# Patient Record
Sex: MALE | Race: BLACK OR AFRICAN AMERICAN | NOT HISPANIC OR LATINO
[De-identification: names, ages, dates, MRNs, and addresses within clinical notes are randomized per-mention and may not be internally consistent; named-entity substitution may affect disease eponyms.]

---

## 2017-06-30 PROBLEM — Z00.00 ENCOUNTER FOR PREVENTIVE HEALTH EXAMINATION: Status: ACTIVE | Noted: 2017-06-30

## 2017-10-26 ENCOUNTER — APPOINTMENT (OUTPATIENT)
Dept: UROLOGY | Facility: CLINIC | Age: 52
End: 2017-10-26
Payer: COMMERCIAL

## 2017-10-26 VITALS
SYSTOLIC BLOOD PRESSURE: 128 MMHG | BODY MASS INDEX: 25.45 KG/M2 | HEIGHT: 73 IN | TEMPERATURE: 97.6 F | WEIGHT: 192 LBS | DIASTOLIC BLOOD PRESSURE: 85 MMHG | RESPIRATION RATE: 15 BRPM | HEART RATE: 56 BPM

## 2017-10-26 PROCEDURE — 99214 OFFICE O/P EST MOD 30 MIN: CPT

## 2018-05-13 ENCOUNTER — EMERGENCY (EMERGENCY)
Facility: HOSPITAL | Age: 53
LOS: 0 days | Discharge: HOME | End: 2018-05-13
Attending: EMERGENCY MEDICINE
Payer: COMMERCIAL

## 2018-05-13 VITALS
HEART RATE: 71 BPM | SYSTOLIC BLOOD PRESSURE: 131 MMHG | DIASTOLIC BLOOD PRESSURE: 87 MMHG | RESPIRATION RATE: 17 BRPM | WEIGHT: 184.97 LBS | TEMPERATURE: 98 F | OXYGEN SATURATION: 99 % | HEIGHT: 73 IN

## 2018-05-13 VITALS
SYSTOLIC BLOOD PRESSURE: 135 MMHG | OXYGEN SATURATION: 98 % | TEMPERATURE: 98 F | DIASTOLIC BLOOD PRESSURE: 81 MMHG | RESPIRATION RATE: 14 BRPM | HEART RATE: 67 BPM

## 2018-05-13 DIAGNOSIS — R55 SYNCOPE AND COLLAPSE: ICD-10-CM

## 2018-05-13 LAB
ALBUMIN SERPL ELPH-MCNC: 3.4 G/DL — SIGNIFICANT CHANGE UP (ref 3.3–5)
ALP SERPL-CCNC: 72 U/L — SIGNIFICANT CHANGE UP (ref 40–120)
ALT FLD-CCNC: 43 U/L — SIGNIFICANT CHANGE UP (ref 12–78)
ANION GAP SERPL CALC-SCNC: 8 MMOL/L — SIGNIFICANT CHANGE UP (ref 5–17)
APPEARANCE UR: CLEAR — SIGNIFICANT CHANGE UP
APTT BLD: 22.9 SEC — LOW (ref 27.5–37.4)
AST SERPL-CCNC: 34 U/L — SIGNIFICANT CHANGE UP (ref 15–37)
BASOPHILS # BLD AUTO: 0.02 K/UL — SIGNIFICANT CHANGE UP (ref 0–0.2)
BASOPHILS NFR BLD AUTO: 0.4 % — SIGNIFICANT CHANGE UP (ref 0–2)
BILIRUB SERPL-MCNC: 0.3 MG/DL — SIGNIFICANT CHANGE UP (ref 0.2–1.2)
BILIRUB UR-MCNC: NEGATIVE — SIGNIFICANT CHANGE UP
BUN SERPL-MCNC: 21 MG/DL — SIGNIFICANT CHANGE UP (ref 7–23)
CALCIUM SERPL-MCNC: 8.2 MG/DL — LOW (ref 8.5–10.1)
CHLORIDE SERPL-SCNC: 110 MMOL/L — HIGH (ref 96–108)
CK MB BLD-MCNC: 0.9 % — SIGNIFICANT CHANGE UP (ref 0–3.5)
CK MB CFR SERPL CALC: 2.6 NG/ML — SIGNIFICANT CHANGE UP (ref 0.5–3.6)
CK MB CFR SERPL CALC: 2.8 NG/ML — SIGNIFICANT CHANGE UP (ref 0.5–3.6)
CK SERPL-CCNC: 297 U/L — SIGNIFICANT CHANGE UP (ref 26–308)
CO2 SERPL-SCNC: 25 MMOL/L — SIGNIFICANT CHANGE UP (ref 22–31)
COLOR SPEC: YELLOW — SIGNIFICANT CHANGE UP
CREAT SERPL-MCNC: 1.17 MG/DL — SIGNIFICANT CHANGE UP (ref 0.5–1.3)
DIFF PNL FLD: NEGATIVE — SIGNIFICANT CHANGE UP
EOSINOPHIL # BLD AUTO: 0.23 K/UL — SIGNIFICANT CHANGE UP (ref 0–0.5)
EOSINOPHIL NFR BLD AUTO: 4.7 % — SIGNIFICANT CHANGE UP (ref 0–6)
GLUCOSE BLDC GLUCOMTR-MCNC: 122 MG/DL — HIGH (ref 70–99)
GLUCOSE SERPL-MCNC: 99 MG/DL — SIGNIFICANT CHANGE UP (ref 70–99)
GLUCOSE UR QL: NEGATIVE MG/DL — SIGNIFICANT CHANGE UP
HCT VFR BLD CALC: 39.2 % — SIGNIFICANT CHANGE UP (ref 39–50)
HGB BLD-MCNC: 12.8 G/DL — LOW (ref 13–17)
IMM GRANULOCYTES NFR BLD AUTO: 0.2 % — SIGNIFICANT CHANGE UP (ref 0–1.5)
INR BLD: 0.98 RATIO — SIGNIFICANT CHANGE UP (ref 0.88–1.16)
KETONES UR-MCNC: NEGATIVE — SIGNIFICANT CHANGE UP
LEUKOCYTE ESTERASE UR-ACNC: NEGATIVE — SIGNIFICANT CHANGE UP
LYMPHOCYTES # BLD AUTO: 1.59 K/UL — SIGNIFICANT CHANGE UP (ref 1–3.3)
LYMPHOCYTES # BLD AUTO: 32.6 % — SIGNIFICANT CHANGE UP (ref 13–44)
MAGNESIUM SERPL-MCNC: 2 MG/DL — SIGNIFICANT CHANGE UP (ref 1.6–2.6)
MCHC RBC-ENTMCNC: 28 PG — SIGNIFICANT CHANGE UP (ref 27–34)
MCHC RBC-ENTMCNC: 32.7 GM/DL — SIGNIFICANT CHANGE UP (ref 32–36)
MCV RBC AUTO: 85.8 FL — SIGNIFICANT CHANGE UP (ref 80–100)
MONOCYTES # BLD AUTO: 0.4 K/UL — SIGNIFICANT CHANGE UP (ref 0–0.9)
MONOCYTES NFR BLD AUTO: 8.2 % — SIGNIFICANT CHANGE UP (ref 2–14)
NEUTROPHILS # BLD AUTO: 2.63 K/UL — SIGNIFICANT CHANGE UP (ref 1.8–7.4)
NEUTROPHILS NFR BLD AUTO: 53.9 % — SIGNIFICANT CHANGE UP (ref 43–77)
NITRITE UR-MCNC: NEGATIVE — SIGNIFICANT CHANGE UP
PH UR: 5 — SIGNIFICANT CHANGE UP (ref 5–8)
PLATELET # BLD AUTO: 229 K/UL — SIGNIFICANT CHANGE UP (ref 150–400)
POTASSIUM SERPL-MCNC: 3.9 MMOL/L — SIGNIFICANT CHANGE UP (ref 3.5–5.3)
POTASSIUM SERPL-SCNC: 3.9 MMOL/L — SIGNIFICANT CHANGE UP (ref 3.5–5.3)
PROT SERPL-MCNC: 7 GM/DL — SIGNIFICANT CHANGE UP (ref 6–8.3)
PROT UR-MCNC: NEGATIVE MG/DL — SIGNIFICANT CHANGE UP
PROTHROM AB SERPL-ACNC: 10.7 SEC — SIGNIFICANT CHANGE UP (ref 9.8–12.7)
RBC # BLD: 4.57 M/UL — SIGNIFICANT CHANGE UP (ref 4.2–5.8)
RBC # FLD: 13.9 % — SIGNIFICANT CHANGE UP (ref 10.3–14.5)
SODIUM SERPL-SCNC: 143 MMOL/L — SIGNIFICANT CHANGE UP (ref 135–145)
SP GR SPEC: 1.01 — SIGNIFICANT CHANGE UP (ref 1.01–1.02)
TROPONIN I SERPL-MCNC: <.015 NG/ML — SIGNIFICANT CHANGE UP (ref 0.01–0.04)
TROPONIN I SERPL-MCNC: <.015 NG/ML — SIGNIFICANT CHANGE UP (ref 0.01–0.04)
UROBILINOGEN FLD QL: NEGATIVE MG/DL — SIGNIFICANT CHANGE UP
WBC # BLD: 4.88 K/UL — SIGNIFICANT CHANGE UP (ref 3.8–10.5)
WBC # FLD AUTO: 4.88 K/UL — SIGNIFICANT CHANGE UP (ref 3.8–10.5)

## 2018-05-13 PROCEDURE — 71045 X-RAY EXAM CHEST 1 VIEW: CPT | Mod: 26

## 2018-05-13 PROCEDURE — 70450 CT HEAD/BRAIN W/O DYE: CPT | Mod: 26

## 2018-05-13 PROCEDURE — 99285 EMERGENCY DEPT VISIT HI MDM: CPT | Mod: 25

## 2018-05-13 PROCEDURE — 93010 ELECTROCARDIOGRAM REPORT: CPT

## 2018-05-13 RX ORDER — SODIUM CHLORIDE 9 MG/ML
1000 INJECTION INTRAMUSCULAR; INTRAVENOUS; SUBCUTANEOUS ONCE
Qty: 0 | Refills: 0 | Status: COMPLETED | OUTPATIENT
Start: 2018-05-13 | End: 2018-05-13

## 2018-05-13 RX ORDER — ACETAMINOPHEN 500 MG
650 TABLET ORAL ONCE
Qty: 0 | Refills: 0 | Status: COMPLETED | OUTPATIENT
Start: 2018-05-13 | End: 2018-05-13

## 2018-05-13 RX ADMIN — SODIUM CHLORIDE 2000 MILLILITER(S): 9 INJECTION INTRAMUSCULAR; INTRAVENOUS; SUBCUTANEOUS at 07:00

## 2018-05-13 NOTE — ED PROVIDER NOTE - MEDICAL DECISION MAKING DETAILS
pt with syncopal episode in setting of pain from typical muscle cramp. Pt also had some pain around left arm/shoulder, ekg without obvious ischemic changes. will do labs, cth, cxr, CE x2, likely f/u pcp.

## 2018-05-13 NOTE — ED ADULT TRIAGE NOTE - CHIEF COMPLAINT QUOTE
woke up with left groing pain at 0500 , felt dizzy and fall syncope x 2 , hit his head, wife saw rolling eyes

## 2018-05-13 NOTE — ED PROVIDER NOTE - OBJECTIVE STATEMENT
51 y/o male no significant pmh present s/p syncopal episode earlier today after walking to bathroom. Pt woke up, had cramp anterior left thigh, typical of cramps he has been getting for many years, pain was severe, felt lightheaded and passed out, hit head per wife. Pt always has some pain in neck, c/o resolving pain along left shoulder between neck/arm. Denies headache, incontinence/tongue biting, hx seizure, cp, sob, abd pain, brbpr, melena, any other symptoms. Says feeling pretty much back to baseline.    ROS: No fever/chills. No eye pain/changes in vision, No ear pain/sore throat/dysphagia, No chest pain/palpitations. No SOB/cough/. No abdominal pain, N/V/D, no black/bloody bm. No dysuria/frequency/discharge, No headache. No Dizziness.    No rashes or breaks in skin. No numbness/tingling/weakness.

## 2018-05-13 NOTE — ED PROVIDER NOTE - PHYSICAL EXAMINATION
Gen: No acute distress, non toxic  HEENT: Mucous membranes moist, pink conjunctivae, EOMI. NCAT. PERRLA  Neck: no midline ttp.   CV: RRR, nl s1/s2. no murmurs  Resp: CTAB, normal rate and effort  GI: Abdomen soft, NT, ND. No rebound, no guarding  : No CVAT  Neuro: A&O x 3, moving all 4 extremities  MSK: No spine or joint tenderness to palpation. no thigh erythema/swelling/ttp. full rom  Skin: No rashes. intact and perfused.

## 2018-05-13 NOTE — ED ADULT NURSE NOTE - OBJECTIVE STATEMENT
BIBA Pt reports passing out x2 last time around 5am, pt reports falling and hitting head at home onto wooden floors. Pt denies c/o pain/discomfort at this time. Denies N/V. Denies dizziness/blurred vision

## 2018-05-13 NOTE — ED PROVIDER NOTE - PROGRESS NOTE DETAILS
Javier: Patient signed out by Dr. Vang pending labs and imaging. All reviewed. Patient re-assessed and continues to be in good condition. patient states he is ready to go home. patient discharged with care instructions. patient is to follow up with pmd. Discussed results and outcome of testing with the patient.  Patient advised to please follow up with their primary care doctor within the next 24 hours and return to the Emergency Department for worsening symptoms or any other concerns.  Patient advised that their doctor may call  to follow up on the specific results of the tests performed today in the emergency department.

## 2018-05-14 LAB
CULTURE RESULTS: NO GROWTH — SIGNIFICANT CHANGE UP
SPECIMEN SOURCE: SIGNIFICANT CHANGE UP

## 2019-01-27 ENCOUNTER — EMERGENCY (EMERGENCY)
Facility: HOSPITAL | Age: 54
LOS: 1 days | Discharge: HOME | End: 2019-01-27
Attending: EMERGENCY MEDICINE | Admitting: EMERGENCY MEDICINE
Payer: COMMERCIAL

## 2019-01-27 VITALS
OXYGEN SATURATION: 97 % | TEMPERATURE: 98 F | RESPIRATION RATE: 17 BRPM | SYSTOLIC BLOOD PRESSURE: 150 MMHG | DIASTOLIC BLOOD PRESSURE: 102 MMHG | HEART RATE: 76 BPM

## 2019-01-27 VITALS
DIASTOLIC BLOOD PRESSURE: 96 MMHG | HEART RATE: 72 BPM | SYSTOLIC BLOOD PRESSURE: 144 MMHG | TEMPERATURE: 98 F | RESPIRATION RATE: 17 BRPM | OXYGEN SATURATION: 100 %

## 2019-01-27 DIAGNOSIS — F40.01 AGORAPHOBIA WITH PANIC DISORDER: ICD-10-CM

## 2019-01-27 DIAGNOSIS — R69 ILLNESS, UNSPECIFIED: ICD-10-CM

## 2019-01-27 PROCEDURE — 90792 PSYCH DIAG EVAL W/MED SRVCS: CPT

## 2019-01-27 PROCEDURE — 99283 EMERGENCY DEPT VISIT LOW MDM: CPT | Mod: 25

## 2019-01-27 PROCEDURE — 99053 MED SERV 10PM-8AM 24 HR FAC: CPT

## 2019-01-27 NOTE — ED BEHAVIORAL HEALTH ASSESSMENT NOTE - DESCRIPTION
calm and cooperative. Appeared tired.   Vital Signs Last 24 Hrs  T(C): 36.8 (27 Jan 2019 02:20), Max: 36.8 (27 Jan 2019 02:20)  T(F): 98.3 (27 Jan 2019 02:20), Max: 98.3 (27 Jan 2019 02:20)  HR: 76 (27 Jan 2019 02:20) (76 - 76)  BP: 150/102 (27 Jan 2019 02:20) (150/102 - 150/102)  BP(mean): --  RR: 17 (27 Jan 2019 02:20) (17 - 17)  SpO2: 97% (27 Jan 2019 02:20) (97% - 97%) denied lives in [place] w/ wife and 24yo son, works for the [employer] for the past 31 years.

## 2019-01-27 NOTE — ED BEHAVIORAL HEALTH ASSESSMENT NOTE - SUMMARY
Mr. Flores is a 54yo AA man w/ no pphx who presented to the medical ED tonight s/p panic attack. Denied any other psychiatric symptoms. Not suicidal and has no hx of suicidality. He is having difficulty to sleep and this MD said he could take benadryl 50mg po qhs PRN for insomnia. Explained that medication will not be started in the ED and encouraged f/u w/ [facility] psychiatrist. He is not a danger to self or others and can be safely discharged to the community.

## 2019-01-27 NOTE — ED BEHAVIORAL HEALTH ASSESSMENT NOTE - HPI (INCLUDE ILLNESS QUALITY, SEVERITY, DURATION, TIMING, CONTEXT, MODIFYING FACTORS, ASSOCIATED SIGNS AND SYMPTOMS)
Patient was seen, and chart was reviewed. Mr. Flores is a 52yo AA man w/ no pphx who presented to the medical ED tonight s/p panic attack. Mr. Flroes said that he first experienced a panic attack in 11/18 in the bus on his way to work. He had a second panic attack in 12/18 and third one today. He said that he feels tachycardic, SOB, and feelings of despair. He said that it last between 2-10 min. Stated feeling claustrophobic as well. Denied any depressive, manic or psychotic symptoms. Endorsed difficulty sleeping. Denied suicidality, no previous attempts, plans, or intent. Denied cigfarette smocking, alcohol or drug use/abuse. He said that he saw a therapist once on 12/8 but did not return for an appointment. Spoke with his wife, who agreed w/ the plan.

## 2019-01-27 NOTE — ED BEHAVIORAL HEALTH ASSESSMENT NOTE - SUICIDE PROTECTIVE FACTORS
Supportive social network or family/Responsibility to family and others/Identifies reasons for living/Future oriented/Engaged in work or school/Positive therapeutic relationships/Fear of death or dying due to pain/suffering/High spirituality/High frustration tolerance/Ability to cope with stress

## 2019-01-27 NOTE — ED ADULT NURSE NOTE - NSIMPLEMENTINTERV_GEN_ALL_ED
Implemented All Universal Safety Interventions:  [place] to call system. Call bell, personal items and telephone within reach. Instruct patient to call for assistance. Room bathroom lighting operational. Non-slip footwear when patient is off stretcher. Physically safe environment: no spills, clutter or unnecessary equipment. Stretcher in lowest position, wheels locked, appropriate side rails in place.

## 2019-01-27 NOTE — ED BEHAVIORAL HEALTH ASSESSMENT NOTE - DETAILS
22yo son lives w/ him sister w/ depression and son w/ cannabis use d/o, substance induced psychosis vs schizophrenia discussed w/ patient and wife.

## 2019-01-27 NOTE — ED ADULT TRIAGE NOTE - CHIEF COMPLAINT QUOTE
Pt c/o panic attack at home, better now.  Denies any SI/HI/AH/VH.  Denies any drugs/ETOH. Calm and cooperative.  MD Salas for eval in triage.

## 2019-01-27 NOTE — ED ADULT NURSE REASSESSMENT NOTE - NS ED NURSE REASSESS COMMENT FT1
Pt is calm and cooperative. A/O x 3. Pt belongings returned by PES. Pt ambulated out of . pt wife was waiting outside of  for him.

## 2019-01-27 NOTE — ED ADULT NURSE NOTE - OBJECTIVE STATEMENT
Pt BIBA for anxiety/panic attack that occurred at home.  Pt currently undergoing testing to find out the cause of syncopal episodes that occurred in december.  Under the care of Neurologist and cardiologist but does not have results yet.  Had MRI awaiting EEG.  states that the unknown cause of the syncopal episodes have been causing increased stress in his life.  denies SI/HI/AVH.  denies any PPhx.

## 2019-01-27 NOTE — ED PROVIDER NOTE - OBJECTIVE STATEMENT
53 year old male presents with racing thoughts and insominia.  felt he could not calm down. denies si or hi.  feels better in ED. currently no complaints

## 2019-01-28 ENCOUNTER — OUTPATIENT (OUTPATIENT)
Dept: OUTPATIENT SERVICES | Facility: HOSPITAL | Age: 54
LOS: 1 days | Discharge: HOME | End: 2019-01-28

## 2019-01-29 DIAGNOSIS — F41.0 PANIC DISORDER [EPISODIC PAROXYSMAL ANXIETY]: ICD-10-CM

## 2019-01-29 DIAGNOSIS — R55 SYNCOPE AND COLLAPSE: ICD-10-CM

## 2019-03-22 ENCOUNTER — APPOINTMENT (OUTPATIENT)
Dept: UROLOGY | Facility: CLINIC | Age: 54
End: 2019-03-22
Payer: COMMERCIAL

## 2019-03-22 VITALS
WEIGHT: 200 LBS | HEIGHT: 73 IN | OXYGEN SATURATION: 98 % | HEART RATE: 60 BPM | TEMPERATURE: 97.2 F | DIASTOLIC BLOOD PRESSURE: 94 MMHG | BODY MASS INDEX: 26.51 KG/M2 | RESPIRATION RATE: 16 BRPM | SYSTOLIC BLOOD PRESSURE: 142 MMHG

## 2019-03-22 DIAGNOSIS — N28.1 CYST OF KIDNEY, ACQUIRED: ICD-10-CM

## 2019-03-22 PROCEDURE — 99213 OFFICE O/P EST LOW 20 MIN: CPT

## 2020-06-04 NOTE — ED ADULT TRIAGE NOTE - HEART RATE (BEATS/MIN)
76 Xenograft Text: The defect edges were debeveled with a #15 scalpel blade.  Given the location of the defect, shape of the defect and the proximity to free margins a xenograft was deemed most appropriate.  The graft was then trimmed to fit the size of the defect.  The graft was then placed in the primary defect and oriented appropriately.

## 2021-01-02 ENCOUNTER — EMERGENCY (EMERGENCY)
Facility: HOSPITAL | Age: 56
LOS: 0 days | Discharge: HOME | End: 2021-01-02
Attending: STUDENT IN AN ORGANIZED HEALTH CARE EDUCATION/TRAINING PROGRAM
Payer: COMMERCIAL

## 2021-01-02 VITALS
TEMPERATURE: 98 F | SYSTOLIC BLOOD PRESSURE: 137 MMHG | RESPIRATION RATE: 20 BRPM | HEART RATE: 88 BPM | OXYGEN SATURATION: 100 % | HEIGHT: 73 IN | DIASTOLIC BLOOD PRESSURE: 93 MMHG | WEIGHT: 197.98 LBS

## 2021-01-02 VITALS
TEMPERATURE: 98 F | RESPIRATION RATE: 18 BRPM | OXYGEN SATURATION: 100 % | DIASTOLIC BLOOD PRESSURE: 78 MMHG | HEART RATE: 74 BPM | SYSTOLIC BLOOD PRESSURE: 124 MMHG

## 2021-01-02 DIAGNOSIS — Z91.013 ALLERGY TO SEAFOOD: ICD-10-CM

## 2021-01-02 DIAGNOSIS — R55 SYNCOPE AND COLLAPSE: ICD-10-CM

## 2021-01-02 DIAGNOSIS — R11.0 NAUSEA: ICD-10-CM

## 2021-01-02 LAB
ALBUMIN SERPL ELPH-MCNC: 3.5 G/DL — SIGNIFICANT CHANGE UP (ref 3.3–5)
ALP SERPL-CCNC: 72 U/L — SIGNIFICANT CHANGE UP (ref 40–120)
ALT FLD-CCNC: 31 U/L — SIGNIFICANT CHANGE UP (ref 12–78)
ANION GAP SERPL CALC-SCNC: 4 MMOL/L — LOW (ref 5–17)
APTT BLD: 23.6 SEC — LOW (ref 27.5–35.5)
AST SERPL-CCNC: 24 U/L — SIGNIFICANT CHANGE UP (ref 15–37)
BASOPHILS # BLD AUTO: 0.03 K/UL — SIGNIFICANT CHANGE UP (ref 0–0.2)
BASOPHILS NFR BLD AUTO: 0.5 % — SIGNIFICANT CHANGE UP (ref 0–2)
BILIRUB SERPL-MCNC: 0.3 MG/DL — SIGNIFICANT CHANGE UP (ref 0.2–1.2)
BUN SERPL-MCNC: 18 MG/DL — SIGNIFICANT CHANGE UP (ref 7–23)
CALCIUM SERPL-MCNC: 9.1 MG/DL — SIGNIFICANT CHANGE UP (ref 8.5–10.1)
CHLORIDE SERPL-SCNC: 106 MMOL/L — SIGNIFICANT CHANGE UP (ref 96–108)
CK MB BLD-MCNC: 0.7 % — SIGNIFICANT CHANGE UP (ref 0–3.5)
CK MB CFR SERPL CALC: 1.4 NG/ML — SIGNIFICANT CHANGE UP (ref 0.5–3.6)
CK SERPL-CCNC: 194 U/L — SIGNIFICANT CHANGE UP (ref 26–308)
CO2 SERPL-SCNC: 29 MMOL/L — SIGNIFICANT CHANGE UP (ref 22–31)
CREAT SERPL-MCNC: 1.31 MG/DL — HIGH (ref 0.5–1.3)
EOSINOPHIL # BLD AUTO: 0.24 K/UL — SIGNIFICANT CHANGE UP (ref 0–0.5)
EOSINOPHIL NFR BLD AUTO: 3.7 % — SIGNIFICANT CHANGE UP (ref 0–6)
GLUCOSE SERPL-MCNC: 111 MG/DL — HIGH (ref 70–99)
HCT VFR BLD CALC: 40.7 % — SIGNIFICANT CHANGE UP (ref 39–50)
HGB BLD-MCNC: 13.7 G/DL — SIGNIFICANT CHANGE UP (ref 13–17)
IMM GRANULOCYTES NFR BLD AUTO: 0.3 % — SIGNIFICANT CHANGE UP (ref 0–1.5)
INR BLD: 0.98 RATIO — SIGNIFICANT CHANGE UP (ref 0.88–1.16)
LYMPHOCYTES # BLD AUTO: 1.56 K/UL — SIGNIFICANT CHANGE UP (ref 1–3.3)
LYMPHOCYTES # BLD AUTO: 24.2 % — SIGNIFICANT CHANGE UP (ref 13–44)
MCHC RBC-ENTMCNC: 28.4 PG — SIGNIFICANT CHANGE UP (ref 27–34)
MCHC RBC-ENTMCNC: 33.7 GM/DL — SIGNIFICANT CHANGE UP (ref 32–36)
MCV RBC AUTO: 84.3 FL — SIGNIFICANT CHANGE UP (ref 80–100)
MONOCYTES # BLD AUTO: 0.41 K/UL — SIGNIFICANT CHANGE UP (ref 0–0.9)
MONOCYTES NFR BLD AUTO: 6.4 % — SIGNIFICANT CHANGE UP (ref 2–14)
NEUTROPHILS # BLD AUTO: 4.19 K/UL — SIGNIFICANT CHANGE UP (ref 1.8–7.4)
NEUTROPHILS NFR BLD AUTO: 64.9 % — SIGNIFICANT CHANGE UP (ref 43–77)
NRBC # BLD: 0 /100 WBCS — SIGNIFICANT CHANGE UP (ref 0–0)
PLATELET # BLD AUTO: 232 K/UL — SIGNIFICANT CHANGE UP (ref 150–400)
POTASSIUM SERPL-MCNC: 4.7 MMOL/L — SIGNIFICANT CHANGE UP (ref 3.5–5.3)
POTASSIUM SERPL-SCNC: 4.7 MMOL/L — SIGNIFICANT CHANGE UP (ref 3.5–5.3)
PROT SERPL-MCNC: 7.4 GM/DL — SIGNIFICANT CHANGE UP (ref 6–8.3)
PROTHROM AB SERPL-ACNC: 11.4 SEC — SIGNIFICANT CHANGE UP (ref 10.6–13.6)
RBC # BLD: 4.83 M/UL — SIGNIFICANT CHANGE UP (ref 4.2–5.8)
RBC # FLD: 13.7 % — SIGNIFICANT CHANGE UP (ref 10.3–14.5)
SARS-COV-2 RNA SPEC QL NAA+PROBE: SIGNIFICANT CHANGE UP
SODIUM SERPL-SCNC: 139 MMOL/L — SIGNIFICANT CHANGE UP (ref 135–145)
TROPONIN I SERPL-MCNC: <.015 NG/ML — SIGNIFICANT CHANGE UP (ref 0.01–0.04)
WBC # BLD: 6.45 K/UL — SIGNIFICANT CHANGE UP (ref 3.8–10.5)
WBC # FLD AUTO: 6.45 K/UL — SIGNIFICANT CHANGE UP (ref 3.8–10.5)

## 2021-01-02 PROCEDURE — 99285 EMERGENCY DEPT VISIT HI MDM: CPT

## 2021-01-02 PROCEDURE — 71045 X-RAY EXAM CHEST 1 VIEW: CPT | Mod: 26

## 2021-01-02 PROCEDURE — 93010 ELECTROCARDIOGRAM REPORT: CPT

## 2021-01-02 PROCEDURE — 70450 CT HEAD/BRAIN W/O DYE: CPT | Mod: 26,MA

## 2021-01-02 NOTE — ED PROVIDER NOTE - CARE PROVIDER_API CALL
Josh Serrano  CARDIOLOGY  [address] [facility], [address] [place] [address]  Phone: [phone]  Fax: [phone]  Follow Up Time: 1-3 Days

## 2021-01-02 NOTE — ED ADULT NURSE REASSESSMENT NOTE - NS ED NURSE REASSESS COMMENT FT1
Pt resting comfortably at this time. No s/s of pain noted. cardiac monitor in progress. Awaiting results

## 2021-01-02 NOTE — ED ADULT NURSE NOTE - ED STAT RN HANDOFF DETAILS 2
Received pt from night RN, pt found resting on stretcher, vitals as noted. will continue to monitor awaiting further orders

## 2021-01-02 NOTE — ED ADULT TRIAGE NOTE - CHIEF COMPLAINT QUOTE
Pt states he woke up lightheaded, nauseated, and vomiting, felt like I was going to pass out.  no chest pain, no loc, no difficulty breathing.

## 2021-01-02 NOTE — ED ADULT NURSE NOTE - OBJECTIVE STATEMENT
Received patient in bed 7. AOX4. 55 year old male with h/o syncope (due to pain) presents today c/o waking up feeling nauseous and lightheaded, pt does state that he felt as if he was going to pass out but was able to sit and take deep breaths and found that his symptoms improved (-) chest pain (-) sob (-) vomiting (-) flu like symptoms (-) leg edema or calf pains (-) palpitations (-) diaphoresis

## 2021-01-02 NOTE — ED PROVIDER NOTE - OBJECTIVE STATEMENT
55 year old male with h/o syncope (due to pain) presents today c/o waking up feeling nauseous and lightheaded, pt does state that he felt as if he was going to pass out but was able to sit and take deep breaths and found that his symptoms improved (-) chest pain (-) sob (-) vomiting (-) flu like symptoms (-) leg edema or calf pains (-) palpitations (-) diaphoresis

## 2021-01-02 NOTE — ED ADULT NURSE NOTE - THROAT
Resolution of colitis. Please complete 10 days of antibiotics with ciprofloxacin and metronidazole. Please eat a high fibre diet (with 3-4 servings of fiber daily) and follow up with gastroenterology to perform a colonoscopy in 4-6 weeks after hospital discharge. asymptomatic

## 2021-01-02 NOTE — ED PROVIDER NOTE - CONSTITUTIONAL, MLM
normal... Well appearing, awake, alert, oriented to person, place, time/situation and in no apparent distress, pt is at his baseline

## 2021-01-02 NOTE — ED PROVIDER NOTE - PROGRESS NOTE DETAILS
Pt signed out by Dr. Arciniega as pending CT head. CT head negative, labs wnl, pt asymptomatic, chest pain free. Pt ready for d/c, f/u w pmd and referred to cardiology.

## 2021-04-14 NOTE — ED BEHAVIORAL HEALTH ASSESSMENT NOTE - REFERRAL / APPOINTMENT DETAILS
Respiratory at bedside.       Prakash Anderson RN  04/13/21 2051 information regarding the [facility] clinic provided

## 2021-12-15 NOTE — ED ADULT NURSE NOTE - NS PRO AD NO ADVANCE DIRECTIVE
Patient is scheduled to see Baylee Peck NP on Wed. 12/22/21 at 1130am. No pre op order seen in epic. Please place \" Service to Family Practice\" informing us in order what required testing is needed for patient to be cleared for upcoming surgery. Thank you.    No

## 2022-01-13 ENCOUNTER — APPOINTMENT (OUTPATIENT)
Dept: PULMONOLOGY | Facility: CLINIC | Age: 57
End: 2022-01-13

## 2022-04-21 ENCOUNTER — NON-APPOINTMENT (OUTPATIENT)
Age: 57
End: 2022-04-21

## 2022-04-21 ENCOUNTER — APPOINTMENT (OUTPATIENT)
Dept: PULMONOLOGY | Facility: CLINIC | Age: 57
End: 2022-04-21
Payer: COMMERCIAL

## 2022-04-21 VITALS
TEMPERATURE: 97 F | HEART RATE: 71 BPM | BODY MASS INDEX: 28.49 KG/M2 | RESPIRATION RATE: 15 BRPM | OXYGEN SATURATION: 98 % | HEIGHT: 73 IN | SYSTOLIC BLOOD PRESSURE: 143 MMHG | DIASTOLIC BLOOD PRESSURE: 88 MMHG | WEIGHT: 215 LBS

## 2022-04-21 DIAGNOSIS — R06.83 SNORING: ICD-10-CM

## 2022-04-21 PROCEDURE — 99204 OFFICE O/P NEW MOD 45 MIN: CPT

## 2022-04-21 NOTE — REVIEW OF SYSTEMS
[EDS: ESS=____] : daytime somnolence: ESS=[unfilled] [Fatigue] : fatigue [Recent Wt Gain (___ Lbs)] : recent [unfilled] ~Ulb weight gain [A.M. Dry Mouth] : a.m. dry mouth [Nocturia] : nocturia [Snoring] : snoring [Frequent Nocturnal Awakenings] : frequent nocturnal awakenings from sleep [Daytime Somnolence: ESS=____] : daytime somnolence: ESS=[unfilled] [Awakes Unrefreshed] : nonrestorative sleep [Awakes With Dry Mouth] : awakes with dry mouth [Hypersomnolence] : sleeping much more than usual [Negative] : Psychiatric [Difficulty Initiating Sleep] : no difficulty falling asleep [Difficulty Maintaining Sleep] : no difficulty maintaining sleep [Lower Extremity Discomfort] : no lower extremity discomfort [Unusual Sleep Behavior] : no unusual sleep behavior

## 2022-04-21 NOTE — PHYSICAL EXAM
[General Appearance - Well Developed] : well developed [Normal Appearance] : normal appearance [Well Groomed] : well groomed [General Appearance - Well Nourished] : well nourished [No Deformities] : no deformities [General Appearance - In No Acute Distress] : no acute distress [Normal Conjunctiva] : the conjunctiva exhibited no abnormalities [Eyelids - No Xanthelasma] : the eyelids demonstrated no xanthelasmas [Low Lying Soft Palate] : low lying soft palate [II] : II [Neck Appearance] : the appearance of the neck was normal [Neck Cervical Mass (___cm)] : no neck mass was observed [Jugular Venous Distention Increased] : there was no jugular-venous distention [Thyroid Diffuse Enlargement] : the thyroid was not enlarged [Thyroid Nodule] : there were no palpable thyroid nodules [Heart Rate And Rhythm] : heart rate was normal and rhythm regular [Heart Sounds] : normal S1 and S2 [Heart Sounds Gallop] : no gallops [Murmurs] : no murmurs [Heart Sounds Pericardial Friction Rub] : no pericardial rub [Auscultation Breath Sounds / Voice Sounds] : lungs were clear to auscultation bilaterally [Abnormal Walk] : normal gait [Musculoskeletal - Swelling] : no joint swelling seen [Motor Tone] : muscle strength and tone were normal [Nail Clubbing] : no clubbing of the fingernails [Cyanosis, Localized] : no localized cyanosis [Petechial Hemorrhages (___cm)] : no petechial hemorrhages [Skin Color & Pigmentation] : normal skin color and pigmentation [Skin Turgor] : normal skin turgor [] : no rash [Oriented To Time, Place, And Person] : oriented to person, place, and time [Impaired Insight] : insight and judgment were intact [Affect] : the affect was normal

## 2022-04-21 NOTE — ASSESSMENT
[FreeTextEntry1] : 55 y/o M with a PMH of HTN presented to the sleep clinic for an initial evaluation.  \par \par His main sleep related complaint is snoring and nonrestorative sleep. His oropharynx is crowded due to enlarged base of tongue and low lying soft palate -- all of which increase risk of BHARATI. Assessment and treatment of BHARATI is important for management of somnolence and nonrestorative sleep as well as for cardiovascular risk reduction.  The patient was referred to the [facility] for diagnostic HSAT for further assessment. The ramifications of obstructive sleep apnea and its potential therapeutic modalities were discussed with the patient. The dangers of drowsy driving were discussed with the patient.  The patient was warned to avoid drowsy driving. The patient will followup after results of this study are available.\par  \par Mateo Menezes, DO\par Pulmonary, Critical Care and Sleep Medicine Attending

## 2022-04-21 NOTE — HISTORY OF PRESENT ILLNESS
[Snoring] : snoring [Frequent Nocturnal Awakening] : frequent nocturnal awakening [Unintentional Sleep While Inactive] : unintentional sleep while inactive [Awakes Unrefreshed] : awakening unrefreshed [Awakening With Dry Mouth] : awakening with dry mouth [Recent  Weight Gain] : recent weight gain [Daytime Somnolence] : daytime somnolence [Hypersomnolence] : hypersomnolence [FreeTextEntry1] : 57 y/o M with a PMH of HTN presented to the sleep clinic for an initial evaluation.  \par \par His main sleep related complaint is snoring and nonrestorative sleep.  He recently retired and goes to bed at 3 am and wakes up at 9 am in the morning.  Notes at least one awakening at night.  (+) nocturia.  Wakes up with a dry mouth and feels unrefreshed.  Has EDS with an ESS of 12.  Has about a 5 lbs weight gain since his longterm.\par \par ____________________________________________________________________\par EPWORTH SLEEPINESS SCALE\par \par How likely are you to doze off or fall asleep in the situations described below, in contrast to feeling just tired?  \par This refers to your usual way of life in recent times.  \par Even if you haven't done some of these things recently, try to work out how they would have affected you.\par Use the following scale to choose one most appropriate number for each situation.\par \par Chance of dozing.............Situation\par ..............3..........................Sitting and reading\par ..............1..........................Watching TV\par ..............1..........................Sitting inactive in a public place (eg a theatre or a meeting)\par ..............1..........................As a passenger in a car for an hour without a break\par ..............3..........................Lying down to rest in the afternoon when circumstances permit\par ..............1..........................Sitting and talking to someone\par ..............1..........................Sitting quietly after lunch without alcohol\par ..............1..........................In a car, while stopped for a few minutes in traffic\par \par .............12...........................TOTAL SCORE\par \par 0 = Never would doze\par 1 = Slight chance of dozing\par 2 = Moderate chance of dozing\par 3 = High chance of dozing \par ______________________________________________________________________  [Witnessed Apneas] : no witnessed sleep apnea [Awakes with Headache] : no headache upon awakening [DIS] : no DIS [DMS] : no DMS [Unusual Sleep Behavior] : no unusual sleep behavior [Cataplexy] : no cataplexy [Sleep Paralysis] : no sleep paralysis [Hypnagogic Hallucinations] : no hallucinations when falling asleep [Hypnopompic Hallucinations] : no hallucinations when awakening [Lower Extremity Discomfort] : no lower extremity discomfort in evening or at bedtime [ESS] : 12

## 2022-06-03 NOTE — ED PROVIDER NOTE - PATIENT PORTAL LINK FT
H&P reviewed. The patient was examined and there are no changes to the H&P.   You can access the FollowMyHealth Patient Portal offered by [facility] by registering at the following website: [email or web address]. By joining [facility] [employer]’s FollowMyHealth portal, you will also be able to view your health information using other applications (apps) compatible with our system.

## 2022-06-12 ENCOUNTER — FORM ENCOUNTER (OUTPATIENT)
Age: 57
End: 2022-06-12

## 2022-06-13 ENCOUNTER — OUTPATIENT (OUTPATIENT)
Dept: OUTPATIENT SERVICES | Facility: HOSPITAL | Age: 57
LOS: 1 days | End: 2022-06-13
Payer: COMMERCIAL

## 2022-06-13 ENCOUNTER — APPOINTMENT (OUTPATIENT)
Dept: SLEEP CENTER | Facility: CLINIC | Age: 57
End: 2022-06-13
Payer: COMMERCIAL

## 2022-06-13 PROCEDURE — 95806 SLEEP STUDY UNATT&RESP EFFT: CPT

## 2022-06-13 PROCEDURE — 95806 SLEEP STUDY UNATT&RESP EFFT: CPT | Mod: 26

## 2022-06-23 ENCOUNTER — OUTPATIENT (OUTPATIENT)
Dept: OUTPATIENT SERVICES | Facility: HOSPITAL | Age: 57
LOS: 1 days | Discharge: HOME | End: 2022-06-23

## 2022-06-23 ENCOUNTER — APPOINTMENT (OUTPATIENT)
Dept: CT IMAGING | Facility: HOSPITAL | Age: 57
End: 2022-06-23

## 2022-06-23 DIAGNOSIS — I77.810 THORACIC AORTIC ECTASIA: ICD-10-CM

## 2022-06-23 PROCEDURE — 71275 CT ANGIOGRAPHY CHEST: CPT | Mod: 26

## 2022-06-28 ENCOUNTER — NON-APPOINTMENT (OUTPATIENT)
Age: 57
End: 2022-06-28

## 2022-06-28 DIAGNOSIS — G47.33 OBSTRUCTIVE SLEEP APNEA (ADULT) (PEDIATRIC): ICD-10-CM

## 2022-07-14 ENCOUNTER — APPOINTMENT (OUTPATIENT)
Dept: PULMONOLOGY | Facility: CLINIC | Age: 57
End: 2022-07-14

## 2022-07-14 DIAGNOSIS — F51.12 INSUFFICIENT SLEEP SYNDROME: ICD-10-CM

## 2022-07-14 DIAGNOSIS — G47.33 OBSTRUCTIVE SLEEP APNEA (ADULT) (PEDIATRIC): ICD-10-CM

## 2022-07-14 PROCEDURE — 99215 OFFICE O/P EST HI 40 MIN: CPT | Mod: 95

## 2022-07-14 RX ORDER — CHLORTHALIDONE 25 MG/1
25 TABLET ORAL
Qty: 90 | Refills: 0 | Status: ACTIVE | COMMUNITY
Start: 2022-03-22

## 2022-07-14 RX ORDER — PREDNISONE 50 MG/1
50 TABLET ORAL
Qty: 3 | Refills: 0 | Status: COMPLETED | COMMUNITY
Start: 2022-06-16

## 2022-07-14 RX ORDER — FLUTICASONE PROPIONATE 50 UG/1
50 SPRAY, METERED NASAL
Refills: 0 | Status: ACTIVE | COMMUNITY

## 2022-07-14 RX ORDER — COVID-19 ANTIGEN TEST
KIT MISCELLANEOUS
Qty: 8 | Refills: 0 | Status: COMPLETED | COMMUNITY
Start: 2022-06-24

## 2022-07-14 RX ORDER — UBIDECARENONE 200 MG
CAPSULE ORAL
Refills: 0 | Status: ACTIVE | COMMUNITY

## 2022-07-14 RX ORDER — POLYETHYLENE GLYCOL-3350 AND ELECTROLYTES WITH FLAVOR PACK 240; 5.84; 2.98; 6.72; 22.72 G/278.26G; G/278.26G; G/278.26G; G/278.26G; G/278.26G
240 POWDER, FOR SOLUTION ORAL
Qty: 4000 | Refills: 0 | Status: COMPLETED | COMMUNITY
Start: 2022-03-25

## 2022-07-14 NOTE — REVIEW OF SYSTEMS
[Fatigue] : fatigue [Shortness Of Breath] : shortness of breath [Nasal Congestion] : nasal congestion [Heartburn] : heartburn [Anxious] : anxious [Negative] : Musculoskeletal [Chest Pain] : no chest pain [Palpitations] : no palpitations [Edema] : ~T edema was not present [Depression] : no depression [FreeTextEntry3] : fatigue attributed to heat [FreeTextEntry8] : D [FreeTextEntry9] : heart beats hard being evaluated by cardiologist [de-identified] : seeing a therapist
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
## 2022-07-14 NOTE — ASSESSMENT
[FreeTextEntry1] : 57 year  KIM BEE  recently diagnosed with severe obstructive sleep apnea, presents for further discussion of apnea and therapy.  History of:  hypertension and anxiety.\par \par SEVERE BHARATI (AHI 48.6) \par \par The ramifications of BHARATI and its potential therapeutic modalities were discussed with the patient.  \par \par SLEEP DEPRIVATION\par \par Insufficient sleep resulting in nonrestorative sleep and sleepiness\par The consequences of sleep deprivation on overall health, mood, concentration, and memory were discussed with the patient. He was strongly recommended to increase sleep time to as close to 7.5 hours as possible, by advancing bedtime from 2am to his preferred 11pm-12am bedtime.   \par \par Patient was advised to acquire his cardiac testing results for his records.\par He was referred to the [facility] for a titration sleep study.  \par He will follow up by phone 2-weeks after the sleep study if he has not been contacted by then, to start therapy.\par \par

## 2022-08-22 ENCOUNTER — EMERGENCY (EMERGENCY)
Facility: HOSPITAL | Age: 57
LOS: 1 days | Discharge: HOME | End: 2022-08-22
Attending: EMERGENCY MEDICINE | Admitting: EMERGENCY MEDICINE

## 2022-08-22 VITALS
TEMPERATURE: 98 F | OXYGEN SATURATION: 100 % | DIASTOLIC BLOOD PRESSURE: 89 MMHG | SYSTOLIC BLOOD PRESSURE: 142 MMHG | HEIGHT: 73 IN | RESPIRATION RATE: 16 BRPM | HEART RATE: 63 BPM

## 2022-08-22 VITALS
SYSTOLIC BLOOD PRESSURE: 137 MMHG | DIASTOLIC BLOOD PRESSURE: 88 MMHG | HEART RATE: 64 BPM | OXYGEN SATURATION: 100 % | RESPIRATION RATE: 18 BRPM | TEMPERATURE: 99 F

## 2022-08-22 LAB
ALBUMIN SERPL ELPH-MCNC: 4.3 G/DL — SIGNIFICANT CHANGE UP (ref 3.3–5)
ALP SERPL-CCNC: 80 U/L — SIGNIFICANT CHANGE UP (ref 40–120)
ALT FLD-CCNC: 23 U/L — SIGNIFICANT CHANGE UP (ref 4–41)
ANION GAP SERPL CALC-SCNC: 11 MMOL/L — SIGNIFICANT CHANGE UP (ref 7–14)
AST SERPL-CCNC: 24 U/L — SIGNIFICANT CHANGE UP (ref 4–40)
BASOPHILS # BLD AUTO: 0.03 K/UL — SIGNIFICANT CHANGE UP (ref 0–0.2)
BASOPHILS NFR BLD AUTO: 0.4 % — SIGNIFICANT CHANGE UP (ref 0–2)
BILIRUB SERPL-MCNC: 0.3 MG/DL — SIGNIFICANT CHANGE UP (ref 0.2–1.2)
BUN SERPL-MCNC: 15 MG/DL — SIGNIFICANT CHANGE UP (ref 7–23)
CALCIUM SERPL-MCNC: 9.6 MG/DL — SIGNIFICANT CHANGE UP (ref 8.4–10.5)
CHLORIDE SERPL-SCNC: 108 MMOL/L — HIGH (ref 98–107)
CO2 SERPL-SCNC: 24 MMOL/L — SIGNIFICANT CHANGE UP (ref 22–31)
CREAT SERPL-MCNC: 1.07 MG/DL — SIGNIFICANT CHANGE UP (ref 0.5–1.3)
EGFR: 81 ML/MIN/1.73M2 — SIGNIFICANT CHANGE UP
EOSINOPHIL # BLD AUTO: 0.11 K/UL — SIGNIFICANT CHANGE UP (ref 0–0.5)
EOSINOPHIL NFR BLD AUTO: 1.4 % — SIGNIFICANT CHANGE UP (ref 0–6)
GLUCOSE SERPL-MCNC: 109 MG/DL — HIGH (ref 70–99)
HCT VFR BLD CALC: 41.9 % — SIGNIFICANT CHANGE UP (ref 39–50)
HGB BLD-MCNC: 13.7 G/DL — SIGNIFICANT CHANGE UP (ref 13–17)
IANC: 5.49 K/UL — SIGNIFICANT CHANGE UP (ref 1.8–7.4)
IMM GRANULOCYTES NFR BLD AUTO: 0.3 % — SIGNIFICANT CHANGE UP (ref 0–1.5)
LIDOCAIN IGE QN: 36 U/L — SIGNIFICANT CHANGE UP (ref 7–60)
LYMPHOCYTES # BLD AUTO: 1.73 K/UL — SIGNIFICANT CHANGE UP (ref 1–3.3)
LYMPHOCYTES # BLD AUTO: 22.3 % — SIGNIFICANT CHANGE UP (ref 13–44)
MCHC RBC-ENTMCNC: 28.2 PG — SIGNIFICANT CHANGE UP (ref 27–34)
MCHC RBC-ENTMCNC: 32.7 GM/DL — SIGNIFICANT CHANGE UP (ref 32–36)
MCV RBC AUTO: 86.2 FL — SIGNIFICANT CHANGE UP (ref 80–100)
MONOCYTES # BLD AUTO: 0.39 K/UL — SIGNIFICANT CHANGE UP (ref 0–0.9)
MONOCYTES NFR BLD AUTO: 5 % — SIGNIFICANT CHANGE UP (ref 2–14)
NEUTROPHILS # BLD AUTO: 5.49 K/UL — SIGNIFICANT CHANGE UP (ref 1.8–7.4)
NEUTROPHILS NFR BLD AUTO: 70.6 % — SIGNIFICANT CHANGE UP (ref 43–77)
NRBC # BLD: 0 /100 WBCS — SIGNIFICANT CHANGE UP (ref 0–0)
NRBC # FLD: 0 K/UL — SIGNIFICANT CHANGE UP (ref 0–0)
PLATELET # BLD AUTO: 237 K/UL — SIGNIFICANT CHANGE UP (ref 150–400)
POTASSIUM SERPL-MCNC: 4.7 MMOL/L — SIGNIFICANT CHANGE UP (ref 3.5–5.3)
POTASSIUM SERPL-SCNC: 4.7 MMOL/L — SIGNIFICANT CHANGE UP (ref 3.5–5.3)
PROT SERPL-MCNC: 7.5 G/DL — SIGNIFICANT CHANGE UP (ref 6–8.3)
RBC # BLD: 4.86 M/UL — SIGNIFICANT CHANGE UP (ref 4.2–5.8)
RBC # FLD: 14 % — SIGNIFICANT CHANGE UP (ref 10.3–14.5)
SODIUM SERPL-SCNC: 143 MMOL/L — SIGNIFICANT CHANGE UP (ref 135–145)
WBC # BLD: 7.77 K/UL — SIGNIFICANT CHANGE UP (ref 3.8–10.5)
WBC # FLD AUTO: 7.77 K/UL — SIGNIFICANT CHANGE UP (ref 3.8–10.5)

## 2022-08-22 PROCEDURE — 99284 EMERGENCY DEPT VISIT MOD MDM: CPT

## 2022-08-22 RX ORDER — METOCLOPRAMIDE HCL 10 MG
10 TABLET ORAL ONCE
Refills: 0 | Status: COMPLETED | OUTPATIENT
Start: 2022-08-22 | End: 2022-08-22

## 2022-08-22 RX ORDER — FAMOTIDINE 10 MG/ML
20 INJECTION INTRAVENOUS ONCE
Refills: 0 | Status: COMPLETED | OUTPATIENT
Start: 2022-08-22 | End: 2022-08-22

## 2022-08-22 RX ADMIN — Medication 10 MILLIGRAM(S): at 03:27

## 2022-08-22 RX ADMIN — Medication 30 MILLILITER(S): at 03:27

## 2022-08-22 RX ADMIN — FAMOTIDINE 20 MILLIGRAM(S): 10 INJECTION INTRAVENOUS at 03:27

## 2022-08-22 NOTE — ED PROVIDER NOTE - ATTENDING CONTRIBUTION TO CARE
I, Dr Nicholas Rodgers wrote the initial note in its entirety and the resident only contributed to the progress note and disposition with which I agree.

## 2022-08-22 NOTE — ED PROVIDER NOTE - PRINCIPAL DIAGNOSIS
Abdominal pain
I personally performed the service described in the documentation recorded by the scribe in my presence, and it accurately and completely records my words and actions.

## 2022-08-22 NOTE — ED PROVIDER NOTE - PROGRESS NOTE DETAILS
Nisha Martinez PGY3: Labs unremarkable. Pt was re-evaluated at bedside, VSS, feeling better overall, tolerating PO. Results were discussed with patient as well as return precautions and follow up plan with PCP and/or specialist. Time was taken to answer any questions that the patient had before providing them with discharge paperwork.

## 2022-08-22 NOTE — ED ADULT TRIAGE NOTE - CHIEF COMPLAINT QUOTE
Pt. brought to [facility] ED by OLIVIA for abdominal pain. Admits to nausea. PMH: Borderline HTN(non-compliant with medications), NAD noted. Pt. appears comfortable at triage.

## 2022-08-22 NOTE — ED PROVIDER NOTE - OBJECTIVE STATEMENT
56 yo M with HTN and no past surgical history that presents with abd pain. Per pt, 3 hours prior to arrival, started feeling "queezy" and went home, had normal BM and felt improved. no N/V but had severe pain in lower abd. Wife called 911. Pt states in ambulance felt like he was going to have diarrhea but only passed gas and felt improvement. Denies any recent illnesses, fever, chills, chest pain, SOB, cough, URI, UTI symptoms. Non smoker.

## 2022-08-22 NOTE — ED ADULT NURSE NOTE - OBJECTIVE STATEMENT
pt received room 2, alert & awake, A&OX4. Pt complaint of intermittent abd pain, fatigue. Pt PMH of HTN. Pt states that sx started around 12AM. Pt noted to be distended, pt denies new finding, abd soft & tender on palpation. Pt denies abd pain, n/v, GI &  sx, c/p, sob.  Will continue to monitor.

## 2022-08-22 NOTE — ED PROVIDER NOTE - PATIENT PORTAL LINK FT
You can access the FollowMyHealth Patient Portal offered by [facility] by registering at the following website: [email or web address]. By joining [employer]s FollowMyHealth portal, you will also be able to view your health information using other applications (apps) compatible with our system.

## 2022-08-22 NOTE — ED ADULT NURSE NOTE - CHIEF COMPLAINT QUOTE
Pt. brought to [facility] ED by OLIVIA for abdominal pain. Admits to nausea. PMH: Borderline HTN(non-compliant with medications), NAD noted. Pt. appears comfortable at triage.

## 2022-08-22 NOTE — ED PROVIDER NOTE - CLINICAL SUMMARY MEDICAL DECISION MAKING FREE TEXT BOX
58 yo M with HTN that presents with abd pain and distension. Felt queezy, had a normal BM and is passing gas in ambulance which both made him feel improvement in symptoms/pain. At this time minimal distension on exam, nontender throughout abd including RUQ/RLQ and EPIGASTRIUM. Likely gas related pains. No surgical history so unlikely SBO and is passing gas. No N/V. Will obtain labs and provide meds and reassess.

## 2023-02-02 ENCOUNTER — APPOINTMENT (OUTPATIENT)
Dept: UROLOGY | Facility: CLINIC | Age: 58
End: 2023-02-02
Payer: COMMERCIAL

## 2023-02-02 DIAGNOSIS — N52.9 MALE ERECTILE DYSFUNCTION, UNSPECIFIED: ICD-10-CM

## 2023-02-02 PROCEDURE — 99203 OFFICE O/P NEW LOW 30 MIN: CPT

## 2023-02-03 NOTE — ASSESSMENT
[FreeTextEntry1] : With his constellation of symptoms and complaints today, I would suggest that he could trial tadalafil 5 mg daily.  This medication may help address both his complaints of the urinary symptoms as well as the erectile dysfunction.  We reviewed the mechanism of this medication and the potential side effects that may be experienced including headaches, sinus congestion, and flushing.  He will let us know how he responds after trialing this medication.\par \par We also reviewed prostate cancer screening.  He has not had a PSA level known for some time although there may have been one checked in your office.  I elected to recheck it today and the level was 1.03 ng/mL suggesting that he remains at very low risk for harboring clinically significant prostate cancer and I do not think he needs any additional work-up such as prostate imaging or biopsy at this time.  I will plan to see him back annually.

## 2023-02-03 NOTE — LETTER BODY
[Dear  ___] : Dear  [unfilled], [Consult Letter:] : I had the pleasure of evaluating your patient, [unfilled]. [Please see my note below.] : Please see my note below. [FreeTextEntry2] : Fabian Garcia MD\par [address]\[place] [address] [FreeTextEntry3] : Sincerely,\par \par \par \par \par Nicholas Valdovinos MD, FACS\par Chief of Urology, [facility]\par  of Urology\par Director of Robotic and Laparoscopic Surgery\par [facility]\par \par [facility]\par [address] [place] [address]\[place] [address]\par P: [phone]\par F: [phone]\par [facility]

## 2023-02-03 NOTE — HISTORY OF PRESENT ILLNESS
[FreeTextEntry1] : Zohaib Flores presents to the office today.  He is a 57-year-old man who is here today with primary complaints of urinary frequency and erectile dysfunction.  I had previously seen him several years ago for urinary symptoms as well.  And he was having issues at that time with urgency and frequency.  His occupation as a  makes it difficult for him to have easy access to the restroom at all times.  We had also performed evaluation of the renal cyst previously which was seen to have benign features and no concern for malignancy.\par \par Today he still has the similar symptoms of urinary urgency and frequency.  He is not currently using any medication for the symptoms.  He also has complaints of erectile dysfunction.  He says he has difficulty achieving good quality erections and maintaining them. \par

## 2023-02-04 ENCOUNTER — NON-APPOINTMENT (OUTPATIENT)
Age: 58
End: 2023-02-04

## 2023-02-04 LAB
PSA FREE FLD-MCNC: 20 %
PSA FREE SERPL-MCNC: 0.2 NG/ML
PSA SERPL-MCNC: 1.03 NG/ML

## 2023-02-06 DIAGNOSIS — R35.0 FREQUENCY OF MICTURITION: ICD-10-CM

## 2023-02-06 RX ORDER — TADALAFIL 5 MG/1
5 TABLET ORAL
Qty: 90 | Refills: 3 | Status: ACTIVE | COMMUNITY
Start: 2023-02-02 | End: 1900-01-01

## 2023-02-16 PROBLEM — I10 ESSENTIAL (PRIMARY) HYPERTENSION: Chronic | Status: ACTIVE | Noted: 2022-08-22

## 2023-06-22 ENCOUNTER — APPOINTMENT (OUTPATIENT)
Dept: PULMONOLOGY | Facility: CLINIC | Age: 58
End: 2023-06-22
Payer: COMMERCIAL

## 2023-06-22 VITALS
DIASTOLIC BLOOD PRESSURE: 75 MMHG | SYSTOLIC BLOOD PRESSURE: 125 MMHG | HEIGHT: 72.5 IN | WEIGHT: 211 LBS | OXYGEN SATURATION: 99 % | HEART RATE: 75 BPM | BODY MASS INDEX: 28.27 KG/M2

## 2023-06-22 DIAGNOSIS — Z83.3 FAMILY HISTORY OF DIABETES MELLITUS: ICD-10-CM

## 2023-06-22 DIAGNOSIS — Z81.8 FAMILY HISTORY OF OTHER MENTAL AND BEHAVIORAL DISORDERS: ICD-10-CM

## 2023-06-22 DIAGNOSIS — Z78.9 OTHER SPECIFIED HEALTH STATUS: ICD-10-CM

## 2023-06-22 DIAGNOSIS — R93.89 ABNORMAL FINDINGS ON DIAGNOSTIC IMAGING OF OTHER SPECIFIED BODY STRUCTURES: ICD-10-CM

## 2023-06-22 PROCEDURE — ZZZZZ: CPT

## 2023-06-22 PROCEDURE — 94729 DIFFUSING CAPACITY: CPT

## 2023-06-22 PROCEDURE — 99215 OFFICE O/P EST HI 40 MIN: CPT | Mod: 25

## 2023-06-22 PROCEDURE — 94060 EVALUATION OF WHEEZING: CPT

## 2023-06-22 PROCEDURE — 94726 PLETHYSMOGRAPHY LUNG VOLUMES: CPT

## 2023-06-22 RX ORDER — MULTIVIT-MIN NO.88/FOLIC ACID 250 MCG
CAPSULE ORAL
Refills: 0 | Status: COMPLETED | COMMUNITY
End: 2023-06-22

## 2023-06-22 RX ORDER — OMEGA-3/DHA/EPA/FISH OIL 300-1000MG
CAPSULE ORAL
Refills: 0 | Status: COMPLETED | COMMUNITY
End: 2023-06-22

## 2023-07-01 ENCOUNTER — EMERGENCY (EMERGENCY)
Facility: HOSPITAL | Age: 58
LOS: 0 days | Discharge: HOME | End: 2023-07-01
Attending: EMERGENCY MEDICINE
Payer: COMMERCIAL

## 2023-07-01 VITALS
HEART RATE: 60 BPM | HEIGHT: 73 IN | RESPIRATION RATE: 18 BRPM | WEIGHT: 207.9 LBS | OXYGEN SATURATION: 99 % | SYSTOLIC BLOOD PRESSURE: 131 MMHG | DIASTOLIC BLOOD PRESSURE: 86 MMHG | TEMPERATURE: 98 F

## 2023-07-01 VITALS
SYSTOLIC BLOOD PRESSURE: 122 MMHG | RESPIRATION RATE: 16 BRPM | HEART RATE: 56 BPM | DIASTOLIC BLOOD PRESSURE: 85 MMHG | TEMPERATURE: 98 F | OXYGEN SATURATION: 100 %

## 2023-07-01 DIAGNOSIS — I10 ESSENTIAL (PRIMARY) HYPERTENSION: ICD-10-CM

## 2023-07-01 DIAGNOSIS — G44.209 TENSION-TYPE HEADACHE, UNSPECIFIED, NOT INTRACTABLE: ICD-10-CM

## 2023-07-01 DIAGNOSIS — Z91.013 ALLERGY TO SEAFOOD: ICD-10-CM

## 2023-07-01 DIAGNOSIS — R51.9 HEADACHE, UNSPECIFIED: ICD-10-CM

## 2023-07-01 LAB
ALBUMIN SERPL ELPH-MCNC: 3.8 G/DL — SIGNIFICANT CHANGE UP (ref 3.3–5)
ALP SERPL-CCNC: 69 U/L — SIGNIFICANT CHANGE UP (ref 40–120)
ALT FLD-CCNC: 26 U/L — SIGNIFICANT CHANGE UP (ref 12–78)
ANION GAP SERPL CALC-SCNC: 2 MMOL/L — LOW (ref 5–17)
AST SERPL-CCNC: 18 U/L — SIGNIFICANT CHANGE UP (ref 15–37)
BASOPHILS # BLD AUTO: 0.02 K/UL — SIGNIFICANT CHANGE UP (ref 0–0.2)
BASOPHILS NFR BLD AUTO: 0.5 % — SIGNIFICANT CHANGE UP (ref 0–2)
BILIRUB SERPL-MCNC: 0.6 MG/DL — SIGNIFICANT CHANGE UP (ref 0.2–1.2)
BUN SERPL-MCNC: 18 MG/DL — SIGNIFICANT CHANGE UP (ref 7–23)
CALCIUM SERPL-MCNC: 10.2 MG/DL — HIGH (ref 8.5–10.1)
CHLORIDE SERPL-SCNC: 104 MMOL/L — SIGNIFICANT CHANGE UP (ref 96–108)
CO2 SERPL-SCNC: 34 MMOL/L — HIGH (ref 22–31)
CREAT SERPL-MCNC: 1.2 MG/DL — SIGNIFICANT CHANGE UP (ref 0.5–1.3)
CRP SERPL-MCNC: <3 MG/L — SIGNIFICANT CHANGE UP
EGFR: 71 ML/MIN/1.73M2 — SIGNIFICANT CHANGE UP
EOSINOPHIL # BLD AUTO: 0.16 K/UL — SIGNIFICANT CHANGE UP (ref 0–0.5)
EOSINOPHIL NFR BLD AUTO: 4.3 % — SIGNIFICANT CHANGE UP (ref 0–6)
ERYTHROCYTE [SEDIMENTATION RATE] IN BLOOD: 18 MM/HR — SIGNIFICANT CHANGE UP (ref 0–20)
GLUCOSE SERPL-MCNC: 116 MG/DL — HIGH (ref 70–99)
HCT VFR BLD CALC: 41.2 % — SIGNIFICANT CHANGE UP (ref 39–50)
HGB BLD-MCNC: 13.8 G/DL — SIGNIFICANT CHANGE UP (ref 13–17)
IMM GRANULOCYTES NFR BLD AUTO: 0 % — SIGNIFICANT CHANGE UP (ref 0–0.9)
LYMPHOCYTES # BLD AUTO: 1.96 K/UL — SIGNIFICANT CHANGE UP (ref 1–3.3)
LYMPHOCYTES # BLD AUTO: 52.8 % — HIGH (ref 13–44)
MCHC RBC-ENTMCNC: 27.6 PG — SIGNIFICANT CHANGE UP (ref 27–34)
MCHC RBC-ENTMCNC: 33.5 G/DL — SIGNIFICANT CHANGE UP (ref 32–36)
MCV RBC AUTO: 82.4 FL — SIGNIFICANT CHANGE UP (ref 80–100)
MONOCYTES # BLD AUTO: 0.31 K/UL — SIGNIFICANT CHANGE UP (ref 0–0.9)
MONOCYTES NFR BLD AUTO: 8.4 % — SIGNIFICANT CHANGE UP (ref 2–14)
NEUTROPHILS # BLD AUTO: 1.26 K/UL — LOW (ref 1.8–7.4)
NEUTROPHILS NFR BLD AUTO: 34 % — LOW (ref 43–77)
NRBC # BLD: 0 /100 WBCS — SIGNIFICANT CHANGE UP (ref 0–0)
PLATELET # BLD AUTO: 252 K/UL — SIGNIFICANT CHANGE UP (ref 150–400)
POTASSIUM SERPL-MCNC: 3.7 MMOL/L — SIGNIFICANT CHANGE UP (ref 3.5–5.3)
POTASSIUM SERPL-SCNC: 3.7 MMOL/L — SIGNIFICANT CHANGE UP (ref 3.5–5.3)
PROT SERPL-MCNC: 7.8 GM/DL — SIGNIFICANT CHANGE UP (ref 6–8.3)
RBC # BLD: 5 M/UL — SIGNIFICANT CHANGE UP (ref 4.2–5.8)
RBC # FLD: 13.8 % — SIGNIFICANT CHANGE UP (ref 10.3–14.5)
SODIUM SERPL-SCNC: 140 MMOL/L — SIGNIFICANT CHANGE UP (ref 135–145)
WBC # BLD: 3.71 K/UL — LOW (ref 3.8–10.5)
WBC # FLD AUTO: 3.71 K/UL — LOW (ref 3.8–10.5)

## 2023-07-01 PROCEDURE — 70450 CT HEAD/BRAIN W/O DYE: CPT | Mod: 26,MA

## 2023-07-01 PROCEDURE — 99284 EMERGENCY DEPT VISIT MOD MDM: CPT

## 2023-07-01 RX ORDER — ACETAMINOPHEN 500 MG
1000 TABLET ORAL ONCE
Refills: 0 | Status: COMPLETED | OUTPATIENT
Start: 2023-07-01 | End: 2023-07-01

## 2023-07-01 RX ADMIN — Medication 500 MILLIGRAM(S): at 13:07

## 2023-07-01 RX ADMIN — Medication 400 MILLIGRAM(S): at 10:20

## 2023-07-01 NOTE — ED PROVIDER NOTE - CARE PROVIDER_API CALL
Patricia Siu  Neurology  [facility] [place] [address]  Phone: [phone]  Fax: [phone]  Follow Up Time: 1-3 Days

## 2023-07-01 NOTE — ED ADULT TRIAGE NOTE - AS PAIN REST
Received request via: Pharmacy    Was the patient seen in the last year in this department? No   LOV 08/27/2019  Does the patient have an active prescription (recently filled or refills available) for medication(s) requested? No     Schedule patient for an appointment?   
10 (severe pain)

## 2023-07-01 NOTE — ED PROVIDER NOTE - NSFOLLOWUPINSTRUCTIONS_ED_ALL_ED_FT
Tension Headache, Adult  A tension headache is a feeling of pain, pressure, or aching over the front and sides of the head. The pain can be dull, or it can feel tight. There are two types of tension headache:  Episodic tension headache. This is when the headaches happen fewer than 15 days a month.  Chronic tension headache. This is when the headaches happen more than 15 days a month during a 3-month period.  A tension headache can last from 30 minutes to several days. It is the most common kind of headache. Tension headaches are not normally associated with nausea or vomiting, and they do not get worse with physical activity.    What are the causes?  The exact cause of this condition is not known. Tension headaches are often triggered by stress, anxiety, or depression. Other triggers may include:  Alcohol.  Too much caffeine or caffeine withdrawal.  Respiratory infections, such as colds, flu, or sinus infections.  Dental problems or teeth clenching.  Fatigue.  Holding your head and neck in the same position for a long period of time, such as while using a computer.  Smoking.  Arthritis of the neck.  What are the signs or symptoms?  Symptoms of this condition include:  A feeling of pressure or tightness around the head.  Dull, aching head pain.  Pain over the front and sides of the head.  Tenderness in the muscles of the head, neck, and shoulders.  How is this diagnosed?  This condition may be diagnosed based on your symptoms, your medical history, and a physical exam.    If your symptoms are severe or unusual, you may have imaging tests, such as a CT scan or an MRI of your head. Your vision may also be checked.    How is this treated?  This condition may be treated with lifestyle changes and with medicines that help relieve symptoms.    Follow these instructions at home:  Managing pain    Take over-the-counter and prescription medicines only as told by your health care provider.  When you have a headache, lie down in a dark, quiet room.  If directed, put ice on your head and neck. To do this:  Put ice in a plastic bag.  Place a towel between your skin and the bag.  Leave the ice on for 20 minutes, 2–3 times a day.  Remove the ice if your skin turns bright red. This is very important. If you cannot feel pain, heat, or cold, you have a greater risk of damage to the area.  If directed, apply heat to the back of your neck as often as told by your health care provider. Use the heat source that your health care provider recommends, such as a moist heat pack or a heating pad.  Place a towel between your skin and the heat source.  Leave the heat on for 20–30 minutes.  Remove the heat if your skin turns bright red. This is especially important if you are unable to feel pain, heat, or cold. You have a greater risk of getting burned.  Eating and drinking    Eat meals on a regular schedule.  If you drink alcohol:  Limit how much you have to:  0–1 drink a day for women who are not pregnant.  0–2 drinks a day for men.  Know how much alcohol is in your drink. In the [place] one drink equals one 12 oz bottle of beer (355 mL), one 5 oz glass of wine (148 mL), or one 1½ oz glass of hard liquor (44 mL).  Drink enough fluid to keep your urine pale yellow.  Decrease your caffeine intake, or stop using caffeine.  Lifestyle    Get 7–9 hours of sleep each night, or get the amount of sleep recommended by your health care provider.  At bedtime, remove computers, phones, and tablets from your room.  Find ways to manage your stress. This may include:  Exercise.  Deep breathing exercises.  Yoga.  Listening to music.  Positive mental imagery.  Try to sit up straight and avoid tensing your muscles.  Do not use any products that contain nicotine or tobacco. These include cigarettes, chewing tobacco, and vaping devices, such as e-cigarettes. If you need help quitting, ask your health care provider.  General instructions      Avoid any headache triggers. Keep a journal to help find out what may trigger your headaches. For example, write down:  What you eat and drink.  How much sleep you get.  Any change to your diet or medicines.  Keep all follow-up visits. This is important.  Contact a health care provider if:  Your headache does not get better.  Your headache comes back.  You are sensitive to sounds, light, or smells because of a headache.  You have nausea or you vomit.  Your stomach hurts.  Get help right away if:  You suddenly develop a severe headache, along with any of the following:  A stiff neck.  Nausea and vomiting.  Confusion.  Weakness in one part or one side of your body.  Double vision or loss of vision.  Shortness of breath.  Rash.  Unusual sleepiness.  Fever or chills.  Trouble speaking.  Pain in your eye or ear.  Trouble walking or balancing.  Feeling faint or passing out.  Summary  A tension headache is a feeling of pain, pressure, or aching over the front and sides of the head.  A tension headache can last from 30 minutes to several days. It is the most common kind of headache.  This condition may be diagnosed based on your symptoms, your medical history, and a physical exam.  This condition may be treated with lifestyle changes and with medicines that help relieve symptoms.  This information is not intended to replace advice given to you by your health care provider. Make sure you discuss any questions you have with your health care provider.

## 2023-07-01 NOTE — ED ADULT NURSE NOTE - OBJECTIVE STATEMENT
58 yo male, A&Ox4, presents to ED c/o right ear severe pain/tenderness radiating to face/head with started Thursday with mild symptoms and got progressively worse last night. Pain is not alleviated by Tylenol. Pain is rated 9/10, worse with touch or movement. Denies fever, drainage, recent illness or sick contacts. PMH: Asthma, HTN, HLD - NKDA

## 2023-07-01 NOTE — ED PROVIDER NOTE - OBJECTIVE STATEMENT
57-year-old male with hypertension history otherwise presenting to the ER due to recurring headache and ear discomfort for the past few years.  Patient states that he has been seeing by neurology as well as ENT with no effective diagnosis.  Patient states there is pain radiating from the right temple and up to the scalp laterally.  Patient states that the pain is sharp and lancinating but otherwise denies any focal symptoms of weakness numbness no loss of vision no hearing deficits noted.  Patient denies any blurred or affected visual acuity.

## 2023-07-01 NOTE — ED PROVIDER NOTE - CLINICAL SUMMARY MEDICAL DECISION MAKING FREE TEXT BOX
Patient with headache on the right temporal region noted otherwise labs performed to rule out temporal arteritis.  ESR being negative with head CT also without acute findings patient notable to have some temporalis muscle tone increased likely secondary to tension headache otherwise.  Will DC on naproxen as needed for headaches and follow-up with neurology as needed.

## 2023-07-01 NOTE — ED PROVIDER NOTE - PATIENT PORTAL LINK FT
You can access the FollowMyHealth Patient Portal offered by [facility] by registering at the following website: [email or web address]. By joining [employer] [facility]’s FollowMyHealth portal, you will also be able to view your health information using other applications (apps) compatible with our system.

## 2023-07-01 NOTE — ED PROVIDER NOTE - ENMT, MLM
Airway patent, Nasal mucosa clear. Mouth with normal mucosa. Throat has no vesicles, no oropharyngeal exudates and uvula is midline. R temporalis region with increased sensitivity and tenderness vs left, no focal swelling.

## 2023-07-10 PROBLEM — Z83.3 FAMILY HISTORY OF DIABETES MELLITUS: Status: ACTIVE | Noted: 2023-06-22

## 2023-07-10 PROBLEM — Z83.3 FAMILY HISTORY OF BORDERLINE DIABETES MELLITUS: Status: ACTIVE | Noted: 2023-06-22

## 2023-07-10 PROBLEM — Z81.8 FAMILY HISTORY OF DEMENTIA: Status: ACTIVE | Noted: 2023-06-22

## 2023-07-10 PROBLEM — R93.89 ABNORMAL CHEST CT: Status: ACTIVE | Noted: 2023-07-10

## 2023-07-11 PROBLEM — Z78.9 SOCIAL ALCOHOL USE: Status: ACTIVE | Noted: 2023-06-22

## 2023-07-11 NOTE — HISTORY OF PRESENT ILLNESS
[TextBox_4] : Mr. Flores is a 57 year-old male with a history of HTN, severe BHARATI, and seasonal allergies who presents for evaluation. He reports dyspnea going up a flight of stairs. He feels that he is out of shape. He denies any dyspnea at rest. Dyspnea is only with activity. He denies any cough, wheezing, chest tightness, chest congestion, or leg swelling. He reports excessive daytime sleepiness and desires to take a nap. He can fall asleep easily reading a book or watching TV. He reports nonrestorative sleep. No morning headache or dry mouth. He is pending in lab CPAP titration in August 2023.\par \par PFTs (June 2022) with normal spirometry, lung volumes, and diffusing capacity. No bronchodilator response.\par \par CT Chest (April 2023) with right upper lobe tree-in-bud focus. Lungs otherwise clear. No thoracic lymphadenopathy. No pleural effusion. \par \par Home sleep apnea test (June 2022) with severe obstructive sleep apnea (FELIPE 48.6) associated with moderate oxygen desaturation. Aristeo oxygen saturation was 82.0% with 10.9% of study time below 90%. Positional dependency was observed with disordered breathing events occurring more frequently in the supine position. CPAP titration for treatment of sleep-disordered breathing is suggested, if clinically indicated. \par \par ECG (April 2023) with NSR, normal axis, normal intervals, no ST/T changes\par \par Labs in March 2023 with normal kidney function, normal CBC.\par \par ETT (Feb 2022) normal.\par \par 2D-echo (Dec 2021) with normal LV systolic function, no significant valvular disease, no pericardial effusion, and normal RV size and systolic function with no evidence of pulmonary hypertension. PASP is 28 mm Hg, which is normal.

## 2023-07-11 NOTE — ASSESSMENT
[FreeTextEntry1] : Mr. Flores is a 57 year-old male with a history of HTN, severe BHARATI, and seasonal allergies who presents for evaluation. He reports dyspnea going up a flight of stairs. He feels that he is out of shape. He denies any dyspnea at rest. Dyspnea is only with activity. He denies any cough, wheezing, chest tightness, chest congestion, or leg swelling. He reports excessive daytime sleepiness and desires to take a nap. He can fall asleep easily reading a book or watching TV. He reports nonrestorative sleep. No morning headache or dry mouth. He is pending in lab CPAP titration in August 2023.\par \par PFTs (June 2022) with normal spirometry, lung volumes, and diffusing capacity. No bronchodilator response.\par \par CT Chest (April 2023) with right upper lobe tree-in-bud focus. Lungs otherwise clear. No thoracic lymphadenopathy. No pleural effusion. \par \par Home sleep apnea test (June 2022) with severe obstructive sleep apnea (FELIPE 48.6) associated with moderate oxygen desaturation. Aristeo oxygen saturation was 82.0% with 10.9% of study time below 90%. Positional dependency was observed with disordered breathing events occurring more frequently in the supine position. CPAP titration for treatment of sleep-disordered breathing is suggested, if clinically indicated. \par \par ECG (April 2023) with NSR, normal axis, normal intervals, no ST/T changes\par \par Labs in March 2023 with normal kidney function, normal CBC.\par \par ETT (Feb 2022) normal.\par \par 2D-echo (Dec 2021) with normal LV systolic function, no significant valvular disease, no pericardial effusion, and normal RV size and systolic function with no evidence of pulmonary hypertension. PASP is 28 mm Hg, which is normal.\par \par Assessment:\par Abnormal CT Chest\par Dyspnea on exertion\par Physical deconditioning\par Severe obstructive sleep apnea\par \par Plan:\par - Reviewed PFTs today, which are normal with no evidence of obstruction or restriction\par - Recommend in lab CPAP titration for treatment of severe BHARATI\par - Recommend to increase exercise as tolerates, would attempt [id number] steps daily\par - Light weight lifting and situps as tolerates\par - Recommend low carbohydrate diet. Patient is considering intermittent fasting for weight loss\par - Repeat CT Chest in April 2024 to monitor for stability of RUL nodular scarring\par - Consider CPET if no improvement in dyspnea on exertion\par - Follow-up after CPAP titration study

## 2023-07-11 NOTE — CONSULT LETTER
[Consult Letter:] : I had the pleasure of evaluating your patient, [unfilled]. [Please see my note below.] : Please see my note below. [Consult Closing:] : Thank you very much for allowing me to participate in the care of this patient.  If you have any questions, please do not hesitate to contact me. [Sincerely,] : Sincerely, [Dear  ___] : Dear  [unfilled], [FreeTextEntry2] : Dr. Mone Roger MD\par Fax: [phone] \par \par Dr. Fabian Garcia MD\par Fax: [phone] [FreeTextEntry3] : Kurtis Live MD\par Attending Physician in Pulmonary & Critical Care Medicine\par  of Medicine\par [employer] [facility]

## 2023-08-25 ENCOUNTER — APPOINTMENT (OUTPATIENT)
Dept: SLEEP CENTER | Facility: CLINIC | Age: 58
End: 2023-08-25
Payer: COMMERCIAL

## 2023-08-25 ENCOUNTER — OUTPATIENT (OUTPATIENT)
Dept: OUTPATIENT SERVICES | Facility: HOSPITAL | Age: 58
LOS: 1 days | End: 2023-08-25
Payer: COMMERCIAL

## 2023-08-25 PROCEDURE — 95811 POLYSOM 6/>YRS CPAP 4/> PARM: CPT | Mod: 26,52

## 2023-08-25 PROCEDURE — 95811 POLYSOM 6/>YRS CPAP 4/> PARM: CPT

## 2023-09-05 ENCOUNTER — NON-APPOINTMENT (OUTPATIENT)
Age: 58
End: 2023-09-05

## 2023-09-14 DIAGNOSIS — G47.33 OBSTRUCTIVE SLEEP APNEA (ADULT) (PEDIATRIC): ICD-10-CM

## 2023-09-28 ENCOUNTER — APPOINTMENT (OUTPATIENT)
Dept: PULMONOLOGY | Facility: CLINIC | Age: 58
End: 2023-09-28
Payer: COMMERCIAL

## 2023-09-28 VITALS
WEIGHT: 203 LBS | HEART RATE: 69 BPM | HEIGHT: 72.5 IN | SYSTOLIC BLOOD PRESSURE: 127 MMHG | DIASTOLIC BLOOD PRESSURE: 78 MMHG | RESPIRATION RATE: 16 BRPM | BODY MASS INDEX: 27.2 KG/M2 | OXYGEN SATURATION: 97 %

## 2023-09-28 DIAGNOSIS — G47.33 OBSTRUCTIVE SLEEP APNEA (ADULT) (PEDIATRIC): ICD-10-CM

## 2023-09-28 DIAGNOSIS — E66.3 OVERWEIGHT: ICD-10-CM

## 2023-09-28 DIAGNOSIS — R53.81 OTHER MALAISE: ICD-10-CM

## 2023-09-28 DIAGNOSIS — R06.09 OTHER FORMS OF DYSPNEA: ICD-10-CM

## 2023-09-28 PROCEDURE — 99214 OFFICE O/P EST MOD 30 MIN: CPT

## 2023-10-16 ENCOUNTER — NON-APPOINTMENT (OUTPATIENT)
Age: 58
End: 2023-10-16

## 2023-10-20 ENCOUNTER — NON-APPOINTMENT (OUTPATIENT)
Age: 58
End: 2023-10-20

## 2024-01-05 ENCOUNTER — APPOINTMENT (OUTPATIENT)
Dept: SLEEP CENTER | Facility: CLINIC | Age: 59
End: 2024-01-05

## 2024-04-10 ENCOUNTER — NON-APPOINTMENT (OUTPATIENT)
Age: 59
End: 2024-04-10

## 2024-04-12 ENCOUNTER — APPOINTMENT (OUTPATIENT)
Dept: CT IMAGING | Facility: IMAGING CENTER | Age: 59
End: 2024-04-12
Payer: COMMERCIAL

## 2024-04-12 ENCOUNTER — OUTPATIENT (OUTPATIENT)
Dept: OUTPATIENT SERVICES | Facility: HOSPITAL | Age: 59
LOS: 1 days | End: 2024-04-12
Payer: COMMERCIAL

## 2024-04-12 DIAGNOSIS — R93.89 ABNORMAL FINDINGS ON DIAGNOSTIC IMAGING OF OTHER SPECIFIED BODY STRUCTURES: ICD-10-CM

## 2024-04-12 PROCEDURE — 71250 CT THORAX DX C-: CPT

## 2024-04-12 PROCEDURE — 71250 CT THORAX DX C-: CPT | Mod: 26
